# Patient Record
Sex: MALE | Race: WHITE | NOT HISPANIC OR LATINO | Employment: FULL TIME | URBAN - METROPOLITAN AREA
[De-identification: names, ages, dates, MRNs, and addresses within clinical notes are randomized per-mention and may not be internally consistent; named-entity substitution may affect disease eponyms.]

---

## 2018-03-14 ENCOUNTER — HOSPITAL ENCOUNTER (OUTPATIENT)
Dept: ONCOLOGY | Facility: CLINIC | Age: 33
Setting detail: INFUSION SERIES
Discharge: HOME | End: 2018-03-14
Attending: INTERNAL MEDICINE | Admitting: INTERNAL MEDICINE

## 2018-03-14 ENCOUNTER — CLINICAL SUPPORT (OUTPATIENT)
Dept: ONCOLOGY | Facility: HOSPITAL | Age: 33
End: 2018-03-14

## 2018-03-14 ENCOUNTER — HOSPITAL ENCOUNTER (OUTPATIENT)
Dept: ONCOLOGY | Facility: HOSPITAL | Age: 33
Discharge: HOME | End: 2018-03-14
Attending: INTERNAL MEDICINE | Admitting: INTERNAL MEDICINE

## 2018-03-14 LAB
ALBUMIN SERPL-MCNC: 4.6 G/DL (ref 3.5–4.8)
ALBUMIN/GLOB SERPL: 2.1 {RATIO} (ref 1–1.7)
ALP SERPL-CCNC: 47 IU/L (ref 32–91)
ALT SERPL-CCNC: 20 IU/L (ref 17–63)
ANION GAP SERPL CALC-SCNC: 12.6 MMOL/L (ref 10–20)
AST SERPL-CCNC: 22 IU/L (ref 15–41)
BILIRUB SERPL-MCNC: 1.2 MG/DL (ref 0.3–1.2)
BUN SERPL-MCNC: 12 MG/DL (ref 8–20)
BUN/CREAT SERPL: 13.3 (ref 6.2–20.3)
CALCIUM SERPL-MCNC: 9.4 MG/DL (ref 8.9–10.3)
CHLORIDE SERPL-SCNC: 103 MMOL/L (ref 101–111)
CONV CO2: 26 MMOL/L (ref 22–32)
CONV TOTAL PROTEIN: 6.8 G/DL (ref 6.1–7.9)
CREAT UR-MCNC: 0.9 MG/DL (ref 0.7–1.2)
GLOBULIN UR ELPH-MCNC: 2.2 G/DL (ref 2.5–3.8)
GLUCOSE SERPL-MCNC: 113 MG/DL (ref 65–99)
POTASSIUM SERPL-SCNC: 3.6 MMOL/L (ref 3.6–5.1)
SODIUM SERPL-SCNC: 138 MMOL/L (ref 136–144)

## 2018-03-14 NOTE — PROGRESS NOTES
PATIENTS ONCOLOGY RECORD LOCATED IN [facility]      Subjective     Name:  CHARLY EISENBERG     Date:  2018  Address:  [address] [place] [address] [place] [facility]  Home: [phone]  :  1985 AGE:  32 y.o.        RECORDS OBTAINED:  Patients Oncology Record is located in [facility]

## 2018-03-16 LAB
ANA SER QL IA: NORMAL

## 2018-04-09 ENCOUNTER — CLINICAL SUPPORT (OUTPATIENT)
Dept: ONCOLOGY | Facility: HOSPITAL | Age: 33
End: 2018-04-09

## 2018-04-09 ENCOUNTER — HOSPITAL ENCOUNTER (OUTPATIENT)
Dept: ONCOLOGY | Facility: CLINIC | Age: 33
Setting detail: INFUSION SERIES
Discharge: HOME | End: 2018-04-09
Attending: INTERNAL MEDICINE | Admitting: INTERNAL MEDICINE

## 2018-04-09 ENCOUNTER — HOSPITAL ENCOUNTER (OUTPATIENT)
Dept: ONCOLOGY | Facility: HOSPITAL | Age: 33
Discharge: HOME | End: 2018-04-09
Attending: INTERNAL MEDICINE | Admitting: INTERNAL MEDICINE

## 2018-04-09 NOTE — PROGRESS NOTES
PATIENTS ONCOLOGY RECORD LOCATED IN [facility]      Subjective     Name:  CHARLY EISENBERG     Date:  2018  Address:  [address] [place] [address] [place] [facility]  Home: [phone]  :  1985 AGE:  32 y.o.        RECORDS OBTAINED:  Patients Oncology Record is located in [facility]

## 2018-08-21 ENCOUNTER — CLINICAL SUPPORT (OUTPATIENT)
Dept: ONCOLOGY | Facility: HOSPITAL | Age: 33
End: 2018-08-21

## 2018-08-21 ENCOUNTER — HOSPITAL ENCOUNTER (OUTPATIENT)
Dept: ONCOLOGY | Facility: CLINIC | Age: 33
Setting detail: INFUSION SERIES
Discharge: HOME | End: 2018-08-21
Attending: INTERNAL MEDICINE | Admitting: INTERNAL MEDICINE

## 2018-08-21 ENCOUNTER — HOSPITAL ENCOUNTER (OUTPATIENT)
Dept: ONCOLOGY | Facility: HOSPITAL | Age: 33
Discharge: HOME | End: 2018-08-21
Attending: INTERNAL MEDICINE | Admitting: INTERNAL MEDICINE

## 2018-08-21 LAB
ALBUMIN SERPL-MCNC: 4.7 G/DL (ref 3.5–4.8)
ALBUMIN/GLOB SERPL: 1.7 {RATIO} (ref 1–1.7)
ALP SERPL-CCNC: 43 IU/L (ref 32–91)
ALT SERPL-CCNC: 23 IU/L (ref 17–63)
ANION GAP SERPL CALC-SCNC: 11.2 MMOL/L (ref 10–20)
AST SERPL-CCNC: 25 IU/L (ref 15–41)
BILIRUB SERPL-MCNC: 2 MG/DL (ref 0.3–1.2)
BUN SERPL-MCNC: 15 MG/DL (ref 8–20)
BUN/CREAT SERPL: 16.7 (ref 6.2–20.3)
CALCIUM SERPL-MCNC: 9.6 MG/DL (ref 8.9–10.3)
CHLORIDE SERPL-SCNC: 106 MMOL/L (ref 101–111)
CONV CO2: 24 MMOL/L (ref 22–32)
CONV TOTAL PROTEIN: 7.4 G/DL (ref 6.1–7.9)
CREAT UR-MCNC: 0.9 MG/DL (ref 0.7–1.2)
GLOBULIN UR ELPH-MCNC: 2.7 G/DL (ref 2.5–3.8)
GLUCOSE SERPL-MCNC: 105 MG/DL (ref 65–99)
POTASSIUM SERPL-SCNC: 4.2 MMOL/L (ref 3.6–5.1)
SODIUM SERPL-SCNC: 137 MMOL/L (ref 136–144)

## 2018-08-21 NOTE — PROGRESS NOTES
PATIENTS ONCOLOGY RECORD LOCATED IN [facility]      Subjective     Name:  CHARLY EISENBERG     Date:  2018  Address:  [address] [place] [address] [place] [facility]  Home: [phone]  :  1985 AGE:  33 y.o.        RECORDS OBTAINED:  Patients Oncology Record is located in [facility]

## 2018-08-22 LAB
IGA1 MFR SER: 211 MG/DL (ref 50–400)
IGG1 SER-MCNC: 1040 MG/DL (ref 600–1500)
IGM SERPL-MCNC: 77 MG/DL (ref 40–300)

## 2018-12-18 ENCOUNTER — HOSPITAL ENCOUNTER (OUTPATIENT)
Dept: ONCOLOGY | Facility: HOSPITAL | Age: 33
Discharge: HOME | End: 2018-12-18
Attending: INTERNAL MEDICINE | Admitting: INTERNAL MEDICINE

## 2018-12-18 ENCOUNTER — CLINICAL SUPPORT (OUTPATIENT)
Dept: ONCOLOGY | Facility: HOSPITAL | Age: 33
End: 2018-12-18

## 2018-12-18 ENCOUNTER — HOSPITAL ENCOUNTER (OUTPATIENT)
Dept: ONCOLOGY | Facility: CLINIC | Age: 33
Setting detail: INFUSION SERIES
Discharge: HOME | End: 2018-12-18
Attending: INTERNAL MEDICINE | Admitting: INTERNAL MEDICINE

## 2018-12-18 LAB
BILIRUB DIRECT SERPL-MCNC: 0.1 MG/DL (ref 0.1–0.5)
BILIRUB INDIRECT SERPL-MCNC: 0.7 MG/DL (ref 0–1.1)
BILIRUB SERPL-MCNC: 0.8 MG/DL (ref 0.3–1.2)

## 2018-12-18 NOTE — PROGRESS NOTES
PATIENTS ONCOLOGY RECORD LOCATED IN [facility]      Subjective     Name:  CHARLY EISENBERG     Date:  2018  Address:  [address] [place] [address] [place] [facility]  Home: [unfilled]  :  1985 AGE:  33 y.o.        RECORDS OBTAINED:  Patients Oncology Record is located in [facility]

## 2019-01-11 ENCOUNTER — HOSPITAL ENCOUNTER (OUTPATIENT)
Dept: ONCOLOGY | Facility: CLINIC | Age: 34
Setting detail: INFUSION SERIES
Discharge: HOME | End: 2019-01-11
Attending: INTERNAL MEDICINE | Admitting: INTERNAL MEDICINE

## 2019-01-11 ENCOUNTER — CLINICAL SUPPORT (OUTPATIENT)
Dept: ONCOLOGY | Facility: HOSPITAL | Age: 34
End: 2019-01-11

## 2019-01-11 NOTE — PROGRESS NOTES
PATIENTS ONCOLOGY RECORD LOCATED IN [facility]      Subjective     Name:  CHARLY EISENBERG     Date:  2019  Address:  [address] [place] [address] [place] [facility]  Home: [unfilled]  :  1985 AGE:  33 y.o.        RECORDS OBTAINED:  Patients Oncology Record is located in [facility]

## 2019-01-31 ENCOUNTER — HOSPITAL ENCOUNTER (OUTPATIENT)
Dept: ONCOLOGY | Facility: HOSPITAL | Age: 34
Discharge: HOME | End: 2019-01-31
Attending: INTERNAL MEDICINE | Admitting: INTERNAL MEDICINE

## 2019-01-31 ENCOUNTER — CLINICAL SUPPORT (OUTPATIENT)
Dept: ONCOLOGY | Facility: HOSPITAL | Age: 34
End: 2019-01-31

## 2019-01-31 ENCOUNTER — HOSPITAL ENCOUNTER (OUTPATIENT)
Dept: ONCOLOGY | Facility: CLINIC | Age: 34
Setting detail: INFUSION SERIES
Discharge: HOME | End: 2019-01-31
Attending: INTERNAL MEDICINE | Admitting: INTERNAL MEDICINE

## 2019-01-31 NOTE — PROGRESS NOTES
PATIENTS ONCOLOGY RECORD LOCATED IN [facility]      Subjective     Name:  CHARLY EISENBERG     Date:  2019  Address:  [address] [place] [address] [place] [facility]  Home: [unfilled]  :  1985 AGE:  33 y.o.        RECORDS OBTAINED:  Patients Oncology Record is located in [facility]

## 2019-07-23 PROBLEM — D70.4 CYCLICAL NEUTROPENIA (HCC): Status: ACTIVE | Noted: 2019-07-23

## 2019-07-23 PROBLEM — D70.9 NEUTROPENIA (HCC): Status: ACTIVE | Noted: 2019-07-23

## 2019-07-23 PROBLEM — E61.1 IRON DEFICIENCY: Status: ACTIVE | Noted: 2019-07-23

## 2019-07-23 NOTE — PROGRESS NOTES
Hematology/Oncology Outpatient Follow Up    PATIENT NAME:Aravind Beasley  :1985  MRN: [id number]  PRIMARY CARE PHYSICIAN: Juliano Maxwell MD  REFERRING PHYSICIAN: Juliano Maxwell MD    No chief complaint on file.       HISTORY OF PRESENT ILLNESS:   1. Neutropenia diagnosed 2017.  • This is a  male who claims to be feeling well. He was living in [place] up until the end of  when he claims to have had a slightly decreased blood count. He saw Juliano Maxwell M.D. as his primary care physician in  and had blood work performed on 17 revealing WBC 3.3 with 43% neutrophils, 43% lymphocytes, 6% monocytes, 6% eosinophils, hemoglobin 14.2, platelet count 160,000. A comprehensive metabolic panel at that time did not have any significant abnormalities and CBC was repeated on 17 revealing WBC 4.0 with 34% neutrophils, 51% lymphocytes, 8% monocytes, 7% eosinophils, hemoglobin 13.5, platelet count 147,000. He then had a CBC repeated recently on 18 revealing WBC 4.5 with 33% neutrophils, 49% lymphocytes, hemoglobin 14.4, platelet count 160,000. Patient was then referred here for further evaluation and today is denying having had any fevers, night sweats or weight loss. He denies any recurrent upper respiratory, genitourinary or skin infections. He has not had any recent viral infections. He does not have any family history of blood dyscrasias. He drinks alcohol socially up to 10 drinks per week.   • 3/14/18 – [facility] on referral by Juliano Maxwell M.D. WBC 3.4 with 50% neutrophils, 39% lymphocytes, 8% monocytes, hemoglobin 15.6, platelet count 143,000. Comprehensive metabolic panel with no significant abnormality. Folate >23.5 (5.9-24.8), Vitamin B12 of 374 (211-911),  (). RATNA screen negative. Neutrophil-associated antibody negative. EBV IgM 21 (<36), EBV IgG 366 (<18). CMV IgM 0.32 (<0.91), CMV  IgG 0.4 (<0.9). Platelet antibodies not detected. Discussed the possibility of patient having cyclic neutropenia.          • 4/9/18 – WBC 6.6 with 45% neutrophils, 45% lymphocytes, 7% monocytes, hemoglobin 15, platelet count 205,000. Peripheral blood flow cytometry with no evidence of abnormal myeloid maturation or increased blast population and no evidence for lymphoproliferative disorder.   • 8/21/18 – WBC 5.3 with 37% neutrophils, 51% lymphocytes, hemoglobin 15.7, platelet count 166,000. Blood pressure 173/107, but patient claims that this morning at home it was 120/85. Denies recurrent infections. IgA 211 (), IgG 1040 (600-1500), IgM 77 (). Free T4 of 0.92 (0.58-1.64), free T3 of 3.8 (2.39-6.79). CMV IgM 0.27 (<0.91), CMV IgG 0.5 (<0.9). Herpes 1 antibody IgG 0.09 (<0.9). Toxoplasma antibody IgG 0.6 (negative <6.4). Rubella antibody 79.2 (>9.9 immune).             • 12/18/18 – WBC 5.2 with 41% neutrophils and 47% lymphocytes. Discussed bone marrow biopsy. Patient will think about it. Total bilirubin 0.8 (0.3-1.2), direct bilirubin 0.1 (.1-0.5), indirect bilirubin 0.7 (0-1.1). T-cell gene rearrangement negative.       • 1/11/19 – Bone marrow aspiration and biopsy with normocellular bone marrow for age (60%) with maturing trilineage hematopoiesis. Marrow fibrosis absent. Decreased iron storage. Cytogenetics with 46 XY normal male karyotype. Immunohistochemical analysis with no evidence of acute leukemia. Flow cytometry with no evidence of abnormal cell type.   • 1/31/19 – Patient claims that is blood pressure was fine at Dr. Maxwell’s office. Discussed results of bone marrow. Recommended followup of counts with no intervention but checking iron stores.   Past Medical History:   Diagnosis Date   • Neutropenia (CMS/HCC) 02/2017       Past Surgical History:   Procedure Laterality Date   • WISDOM TOOTH EXTRACTION  2002       No current outpatient medications on file.    Allergies not on file    Family  History   Problem Relation Age of Onset   • Breast cancer Maternal Aunt 60   • Breast cancer Maternal Cousin 50       Cancer-related family history includes Breast cancer (age of onset: 50) in his maternal cousin; Breast cancer (age of onset: 60) in his maternal aunt.    Social History     Tobacco Use   • Smoking status: Former Smoker     Types: Cigarettes   • Tobacco comment: smoked about 2 cigarettes a day for 4 years in college, quitting in 2011   Substance Use Topics   • Alcohol use: Yes     Comment: drinks about 10 alcoholic drinks a week   • Drug use: No       I have reviewed the history of present illness, past medical history, family history, social history, lab results, all notes and other records since the patient was last seen on Visit 1/31/19.    SUBJECTIVE:      Kylie Amador CMA (PAWAN) was present during office visit.           REVIEW OF SYSTEMS:  Review of Systems    OBJECTIVE:    There were no vitals filed for this visit.    ECOG  {[facility] ECOG Status:65001}    Physical Exam    RECENT LABS  No results found for: WBC, RBC, HGB, HCT, MCV, MCH, MCHC, RDW, RDWSD, MPV, PLT, NEUTRORELPCT, LYMPHORELPCT, MONORELPCT, EOSRELPCT, BASORELPCT, AUTOIGPER, NEUTROABS, LYMPHSABS, MONOSABS, EOSABS, BASOSABS, AUTOIGNUM, NRBC    Lab Results   Component Value Date    GLUCOSE 105 (H) 08/21/2018    BUN 15 08/21/2018    CREATININE 0.9 08/21/2018    BCR 16.7 08/21/2018    K 4.2 08/21/2018    CO2 24 08/21/2018    CALCIUM 9.6 08/21/2018    ALBUMIN 4.7 08/21/2018    LABIL2 1.7 08/21/2018    AST 25 08/21/2018    ALT 23 08/21/2018         ASSESSMENT:    Cyclical neutropenia (CMS/HCC)    Iron deficiency              PLAN:     1.          I have reviewed labs results, imaging, vitals, and medications with the patient today. Will follow up in *** months with ***.         Patient verbalized understanding and is in agreement of the above plan.      Much of the above report is an electronic transcription//translation of the  spoken language to printed text using Dragon Software. As such, the subtleties and finesse of the spoken language may permit erroneous, or at times, nonsensical words or phrases to be inadvertently transcribed; thus changes may be made at a later date to rectify these errors.

## 2019-07-26 ENCOUNTER — APPOINTMENT (OUTPATIENT)
Dept: ONCOLOGY | Facility: CLINIC | Age: 34
End: 2019-07-26

## 2019-07-26 ENCOUNTER — APPOINTMENT (OUTPATIENT)
Dept: LAB | Facility: HOSPITAL | Age: 34
End: 2019-07-26

## 2019-08-09 ENCOUNTER — APPOINTMENT (OUTPATIENT)
Dept: LAB | Facility: HOSPITAL | Age: 34
End: 2019-08-09

## 2019-08-09 ENCOUNTER — OFFICE VISIT (OUTPATIENT)
Dept: ONCOLOGY | Facility: CLINIC | Age: 34
End: 2019-08-09

## 2019-08-09 VITALS
SYSTOLIC BLOOD PRESSURE: 154 MMHG | TEMPERATURE: 98.2 F | HEIGHT: 71 IN | RESPIRATION RATE: 16 BRPM | WEIGHT: 203.6 LBS | DIASTOLIC BLOOD PRESSURE: 101 MMHG | HEART RATE: 80 BPM | BODY MASS INDEX: 28.5 KG/M2

## 2019-08-09 DIAGNOSIS — D70.4 CYCLICAL NEUTROPENIA (HCC): ICD-10-CM

## 2019-08-09 DIAGNOSIS — E61.1 IRON DEFICIENCY: Primary | ICD-10-CM

## 2019-08-09 LAB
BASOPHILS # BLD AUTO: 0.03 10*3/MM3 (ref 0–0.2)
BASOPHILS NFR BLD AUTO: 0.6 % (ref 0–1.5)
DEPRECATED RDW RBC AUTO: 42.7 FL (ref 37–54)
EOSINOPHIL # BLD AUTO: 0.29 10*3/MM3 (ref 0–0.4)
EOSINOPHIL NFR BLD AUTO: 5.9 % (ref 0.3–6.2)
ERYTHROCYTE [DISTWIDTH] IN BLOOD BY AUTOMATED COUNT: 13 % (ref 12.3–15.4)
HCT VFR BLD AUTO: 45.4 % (ref 37.5–51)
HGB BLD-MCNC: 15.6 G/DL (ref 13–17.7)
LYMPHOCYTES # BLD AUTO: 2.39 10*3/MM3 (ref 0.7–3.1)
LYMPHOCYTES NFR BLD AUTO: 48.7 % (ref 19.6–45.3)
MCH RBC QN AUTO: 31.4 PG (ref 26.6–33)
MCHC RBC AUTO-ENTMCNC: 34.4 G/DL (ref 31.5–35.7)
MCV RBC AUTO: 91.3 FL (ref 79–97)
MONOCYTES # BLD AUTO: 0.38 10*3/MM3 (ref 0.1–0.9)
MONOCYTES NFR BLD AUTO: 7.7 % (ref 5–12)
NEUTROPHILS # BLD AUTO: 1.82 10*3/MM3 (ref 1.7–7)
NEUTROPHILS NFR BLD AUTO: 37.1 % (ref 42.7–76)
PLATELET # BLD AUTO: 163 10*3/MM3 (ref 140–450)
PMV BLD AUTO: 10.5 FL (ref 6–12)
RBC # BLD AUTO: 4.97 10*6/MM3 (ref 4.14–5.8)
WBC NRBC COR # BLD: 4.91 10*3/MM3 (ref 3.4–10.8)

## 2019-08-09 PROCEDURE — 85025 COMPLETE CBC W/AUTO DIFF WBC: CPT | Performed by: NURSE PRACTITIONER

## 2019-08-09 PROCEDURE — 36415 COLL VENOUS BLD VENIPUNCTURE: CPT | Performed by: NURSE PRACTITIONER

## 2019-08-09 PROCEDURE — 99213 OFFICE O/P EST LOW 20 MIN: CPT | Performed by: NURSE PRACTITIONER

## 2019-08-09 NOTE — PROGRESS NOTES
Aravind Beasley  1985    Primary Care Physician: Juliano Maxwell MD  Referring Physician: Juliano Maxwell MD  Reason For Visit:    Chief Complaint   Patient presents with   • Follow-up     Cyclic neutropenia and iron deficiency         HISTORY OF PRESENT ILLNESS:   1. Neutropenia diagnosed February 2017.  • This is a  male who claims to be feeling well. He was living in [place] up until the end of 2013 when he claims to have had a slightly decreased blood count. He saw Juliano Maxwell M.D. as his primary care physician in 2017 and had blood work performed on 2/14/17 revealing WBC 3.3 with 43% neutrophils, 43% lymphocytes, 6% monocytes, 6% eosinophils, hemoglobin 14.2, platelet count 160,000. A comprehensive metabolic panel at that time did not have any significant abnormalities and CBC was repeated on 6/26/17 revealing WBC 4.0 with 34% neutrophils, 51% lymphocytes, 8% monocytes, 7% eosinophils, hemoglobin 13.5, platelet count 147,000. He then had a CBC repeated recently on 2/16/18 revealing WBC 4.5 with 33% neutrophils, 49% lymphocytes, hemoglobin 14.4, platelet count 160,000. Patient was then referred here for further evaluation and today is denying having had any fevers, night sweats or weight loss. He denies any recurrent upper respiratory, genitourinary or skin infections. He has not had any recent viral infections. He does not have any family history of blood dyscrasias. He drinks alcohol socially up to 10 drinks per week.   • 3/14/18 - [facility]enia on referral by Juliano Maxwell M.D. WBC 3.4 with 50% neutrophils, 39% lymphocytes, 8% monocytes, hemoglobin 15.6, platelet count 143,000. Comprehensive metabolic panel with no significant abnormality. Folate >23.5 (5.9-24.8), Vitamin B12 of 374 (211-911),  (). RATNA screen negative. Neutrophil-associated antibody negative. EBV IgM 21 (<36), EBV IgG 366 (<18). CMV IgM 0.32  (<0.91), CMV IgG 0.4 (<0.9). Platelet antibodies not detected. Discussed the possibility of patient having cyclic neutropenia.          • 4/9/18 - WBC 6.6 with 45% neutrophils, 45% lymphocytes, 7% monocytes, hemoglobin 15, platelet count 205,000. Peripheral blood flow cytometry with no evidence of abnormal myeloid maturation or increased blast population and no evidence for lymphoproliferative disorder.   • 8/21/18 - WBC 5.3 with 37% neutrophils, 51% lymphocytes, hemoglobin 15.7, platelet count 166,000. Blood pressure 173/107, but patient claims that this morning at home it was 120/85. Denies recurrent infections. IgA 211 (), IgG 1040 (600-1500), IgM 77 (). Free T4 of 0.92 (0.58-1.64), free T3 of 3.8 (2.39-6.79). CMV IgM 0.27 (<0.91), CMV IgG 0.5 (<0.9). Herpes 1 antibody IgG 0.09 (<0.9). Toxoplasma antibody IgG 0.6 (negative <6.4). Rubella antibody 79.2 (>9.9 immune).             • 12/18/18 - WBC 5.2 with 41% neutrophils and 47% lymphocytes. Discussed bone marrow biopsy. Patient will think about it. Total bilirubin 0.8 (0.3-1.2), direct bilirubin 0.1 (.1-0.5), indirect bilirubin 0.7 (0-1.1). T-cell gene rearrangement negative.       • 1/11/19 - Bone marrow aspiration and biopsy with normocellular bone marrow for age (60%) with maturing trilineage hematopoiesis. Marrow fibrosis absent. Decreased iron storage. Cytogenetics with 46 XY normal male karyotype. Immunohistochemical analysis with no evidence of acute leukemia. Flow cytometry with no evidence of abnormal cell type.   • 1/31/19 - Patient claims that is blood pressure was fine at Dr. Maxwell’s office. Discussed results of bone marrow. Recommended followup of counts with no intervention but checking iron stores.  Ferritin 116 (N).       Past Medical History:   Diagnosis Date   • Neutropenia (CMS/HCC) 02/2017       Past Surgical History:   Procedure Laterality Date   • WISDOM TOOTH EXTRACTION  2002       No current outpatient medications on  file.    Allergies   Allergen Reactions   • Penicillins Rash     Patient states he does not remember his reaction   • Sulfa Antibiotics Rash     Patient states he does not remember his reaction       Family History   Problem Relation Age of Onset   • Breast cancer Maternal Aunt 60   • Breast cancer Maternal Cousin 50       Cancer-related family history includes Breast cancer (age of onset: 50) in his maternal cousin; Breast cancer (age of onset: 60) in his maternal aunt.    Social History     Tobacco Use   • Smoking status: Former Smoker     Types: Cigarettes   • Tobacco comment: smoked about 2 cigarettes a day for 4 years in college, quitting in 2011   Substance Use Topics   • Alcohol use: Yes     Comment: drinks about 10 alcoholic drinks a week   • Drug use: No     I have reviewed the history of present illness, past medical history, family history, social history, lab results, all notes and other records since the patient was last seen on 1/31/19.    Subjective: The patient is here for follow-up of his cyclic neutropenia and iron deficiency.  He states he is doing well, his blood pressure this morning was 135/84.  He is exercising and training for the [employer].  He does not add salt to his foods.  He saw the nurse practitioner at his PMD's office a week ago for fluid in 1 of his ears and was put on prednisone pack.        Review of Systems   Constitutional: Negative for diaphoresis, fatigue, fever and unexpected weight change.   HENT: Negative for congestion and nosebleeds.    Eyes: Negative.    Respiratory: Negative for cough and shortness of breath.    Cardiovascular: Negative for chest pain and leg swelling.   Gastrointestinal: Negative for abdominal pain, blood in stool, constipation, diarrhea, nausea and vomiting.   Endocrine: Negative for cold intolerance and heat intolerance.   Genitourinary: Negative for dysuria and hematuria.   Musculoskeletal: Negative for arthralgias and joint swelling.   Skin: Negative  "for rash and wound.   Neurological: Negative for numbness and headaches.   Hematological: Does not bruise/bleed easily.   Psychiatric/Behavioral: Negative for confusion. The patient is not nervous/anxious.    All other systems reviewed and are negative.      Objective:    Vitals:    08/09/19 0818   BP: (!) 154/101   Pulse: 80   Resp: 16   Temp: 98.2 °F (36.8 °C)   Weight: 92.4 kg (203 lb 9.6 oz)   Height: 180.3 cm (71\")   PainSc: 0-No pain     Body mass index is 28.4 kg/m².      08/09/19 0818   Weight: 92.4 kg (203 lb 9.6 oz)       KPS: 100%   Physical Exam   Constitutional: He is oriented to person, place, and time. He appears well-developed and well-nourished.   Healthy appearing.  Alone.   HENT:   Head: Normocephalic and atraumatic.   Mouth/Throat: Oropharynx is clear and moist.   Eyes: Conjunctivae, EOM and lids are normal. Pupils are equal, round, and reactive to light.   Neck: Normal range of motion. Neck supple. No thyromegaly present.   Cardiovascular: Normal rate, regular rhythm and normal heart sounds.   No murmur heard.  Pulmonary/Chest: Effort normal and breath sounds normal. No respiratory distress.   Abdominal: Soft. Normal appearance and bowel sounds are normal. He exhibits no distension.   Genitourinary:   Genitourinary Comments: Deferred.   Musculoskeletal: Normal range of motion. He exhibits no edema.   Lymphadenopathy:     He has no cervical adenopathy.     He has no axillary adenopathy.        Right: No supraclavicular adenopathy present.        Left: No supraclavicular adenopathy present.   Neurological: He is alert and oriented to person, place, and time.   Skin: Skin is warm and dry. Capillary refill takes less than 2 seconds. No bruising, no petechiae and no rash noted.   Psychiatric: He has a normal mood and affect. His speech is normal and behavior is normal. Judgment and thought content normal. Cognition and memory are normal.   Nursing note and vitals reviewed.      WBC   Date Value Ref " Range Status   08/09/2019 4.91 3.40 - 10.80 10*3/mm3 Final     RBC   Date Value Ref Range Status   08/09/2019 4.97 4.14 - 5.80 10*6/mm3 Final     Hemoglobin   Date Value Ref Range Status   08/09/2019 15.6 13.0 - 17.7 g/dL Final     Hematocrit   Date Value Ref Range Status   08/09/2019 45.4 37.5 - 51.0 % Final     MCV   Date Value Ref Range Status   08/09/2019 91.3 79.0 - 97.0 fL Final     MCH   Date Value Ref Range Status   08/09/2019 31.4 26.6 - 33.0 pg Final     MCHC   Date Value Ref Range Status   08/09/2019 34.4 31.5 - 35.7 g/dL Final     RDW   Date Value Ref Range Status   08/09/2019 13.0 12.3 - 15.4 % Final     RDW-SD   Date Value Ref Range Status   08/09/2019 42.7 37.0 - 54.0 fl Final     MPV   Date Value Ref Range Status   08/09/2019 10.5 6.0 - 12.0 fL Final     Platelets   Date Value Ref Range Status   08/09/2019 163 140 - 450 10*3/mm3 Final     Neutrophil %   Date Value Ref Range Status   08/09/2019 37.1 (L) 42.7 - 76.0 % Final     Lymphocyte %   Date Value Ref Range Status   08/09/2019 48.7 (H) 19.6 - 45.3 % Final     Monocyte %   Date Value Ref Range Status   08/09/2019 7.7 5.0 - 12.0 % Final     Eosinophil %   Date Value Ref Range Status   08/09/2019 5.9 0.3 - 6.2 % Final     Basophil %   Date Value Ref Range Status   08/09/2019 0.6 0.0 - 1.5 % Final     Neutrophils, Absolute   Date Value Ref Range Status   08/09/2019 1.82 1.70 - 7.00 10*3/mm3 Final     Lymphocytes, Absolute   Date Value Ref Range Status   08/09/2019 2.39 0.70 - 3.10 10*3/mm3 Final     Monocytes, Absolute   Date Value Ref Range Status   08/09/2019 0.38 0.10 - 0.90 10*3/mm3 Final     Eosinophils, Absolute   Date Value Ref Range Status   08/09/2019 0.29 0.00 - 0.40 10*3/mm3 Final     Basophils, Absolute   Date Value Ref Range Status   08/09/2019 0.03 0.00 - 0.20 10*3/mm3 Final         Assessment/Plan      Assessment  1.  Cyclic neutropenia-white blood cell count slightly low with an adequate ANC.  No fevers.  Continue to follow  2.  Iron  deficiency-ferritin level normal.  Denies overt bleeding.  Stool heme ordered.  Urinalysis next visit to evaluate for possible blood loss that is seen frequently in athletes.  3.  HTN-pressure 154/101.  Complicated by recent steroids.  CMP next visit. Continue to monitor and discuss with primary MD.    PLAN  Stool heme  CMP and UA next visit  Limit salt intake.           I have reviewed labs results, vitals, and medications with the patient today. Will follow up in 6 months with MD.      Electronically signed by CINTHIA Shahid, 08/09/19, 6:25 PM.    Much of the above report is an electronic transcription/translation of the spoken language to printed text using Dragon Software. As such, the subtleties and finesse of the spoken language may permit erroneous, or at times, nonsensical words or phrases to be inadvertently transcribed; thus changes may be made at a later date to rectify these errors.

## 2020-02-13 ENCOUNTER — APPOINTMENT (OUTPATIENT)
Dept: LAB | Facility: HOSPITAL | Age: 35
End: 2020-02-13

## 2024-12-27 ENCOUNTER — HOSPITAL ENCOUNTER (EMERGENCY)
Facility: HOSPITAL | Age: 39
Discharge: HOME | End: 2024-12-27
Attending: EMERGENCY MEDICINE
Payer: COMMERCIAL

## 2024-12-27 ENCOUNTER — APPOINTMENT (OUTPATIENT)
Dept: GENERAL RADIOLOGY | Facility: HOSPITAL | Age: 39
End: 2024-12-27
Payer: COMMERCIAL

## 2024-12-27 VITALS
OXYGEN SATURATION: 99 % | TEMPERATURE: 99.1 F | WEIGHT: 238.1 LBS | HEIGHT: 71 IN | RESPIRATION RATE: 18 BRPM | HEART RATE: 138 BPM | DIASTOLIC BLOOD PRESSURE: 115 MMHG | BODY MASS INDEX: 33.33 KG/M2 | SYSTOLIC BLOOD PRESSURE: 203 MMHG
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
## 2024-12-27 DIAGNOSIS — S86.912A KNEE STRAIN, LEFT, INITIAL ENCOUNTER: Primary | ICD-10-CM

## 2024-12-27 PROCEDURE — 73562 X-RAY EXAM OF KNEE 3: CPT

## 2024-12-27 PROCEDURE — 99283 EMERGENCY DEPT VISIT LOW MDM: CPT

## 2024-12-27 NOTE — ED PROVIDER NOTES
"Subjective   History of Present Illness  Chief complaint: Left knee pain    39-year-old male presents with left knee pain after a fall yesterday.  Patient states he slipped and fell and landed on his left knee.  States he feels like the left knee twisted a little bit as well.  He has been able to ambulate but with some pain.  He states pain is mostly along the medial aspect of the knee.  He denies any other injuries from the fall.    History provided by:  Patient      Review of Systems   Constitutional:  Negative for fever.   HENT:  Negative for congestion.    Respiratory:  Negative for cough and shortness of breath.    Cardiovascular:  Negative for chest pain.   Gastrointestinal:  Negative for abdominal pain.   Musculoskeletal:  Negative for back pain.        Left knee pain   Neurological:  Negative for headaches.   Psychiatric/Behavioral:  Negative for confusion.        Past Medical History:   Diagnosis Date    Neutropenia 02/2017       Allergies   Allergen Reactions    Penicillins Rash     Patient states he does not remember his reaction    Sulfa Antibiotics Rash     Patient states he does not remember his reaction       Past Surgical History:   Procedure Laterality Date    WISDOM TOOTH EXTRACTION  2002       Family History   Problem Relation Age of Onset    Breast cancer Maternal Aunt 60    Breast cancer Maternal Cousin 50       Social History     Socioeconomic History    Marital status:    Tobacco Use    Smoking status: Former     Types: Cigarettes    Tobacco comments:     smoked about 2 cigarettes a day for 4 years in college, quitting in 2011   Substance and Sexual Activity    Alcohol use: Yes     Comment: drinks about 10 alcoholic drinks a week    Drug use: No       BP (!) 203/115 (BP Location: Left arm, Patient Position: Sitting)   Pulse (!) 138   Temp 99.1 °F (37.3 °C) (Oral)   Resp 18   Ht 180.3 cm (71\")   Wt 108 kg (238 lb 1.6 oz)   SpO2 99%   BMI 33.21 kg/m²       Objective   Physical " Exam  Vitals and nursing note reviewed.   Constitutional:       Appearance: Normal appearance.   HENT:      Head: Normocephalic and atraumatic.      Mouth/Throat:      Mouth: Mucous membranes are moist.   Cardiovascular:      Rate and Rhythm: Normal rate and regular rhythm.   Pulmonary:      Effort: Pulmonary effort is normal. No respiratory distress.   Musculoskeletal:      Comments: Tender to palpation along the medial aspect of the left knee with no visible injury or deformity.  No obvious ligamentous laxity.  Neurovascular intact distally.   Skin:     General: Skin is warm and dry.   Neurological:      Mental Status: He is alert and oriented to person, place, and time.         Procedures           ED Course      XR Knee 3 View Left    Result Date: 12/27/2024  Impression: Tiny superior patellar enthesophyte. Otherwise, normal left knee series Electronically Signed: Latoya Short MD  12/27/2024 11:55 AM EST  Workstation ID: [id number]                                                    Medical Decision Making    My interpretation of left knee x-ray shows no fracture or dislocation.  Patient was placed in a knee immobilizer.  He will be discharged to follow-up with his primary doctor and orthopedic doctor.  We discussed possible need for MRI if symptoms continue      Final diagnoses:   Knee strain, left, initial encounter   Fall, initial encounter       ED Disposition  ED Disposition       ED Disposition   Discharge    Condition   Stable    Comment   --               Juliano Maxwell MD  [address] [facility] [place] [id number] [phone]    Call in 2 days           Medication List      No changes were made to your prescriptions during this visit.            Waylon Wakefield MD  12/27/24 [id number]

## 2024-12-27 NOTE — DISCHARGE INSTRUCTIONS
Follow-up with your primary doctor.  Return to the emergency room for any new or worsening symptoms or if you have any other questions or concerns.  Use knee immobilizer as needed for comfort.  Okay to take knee out of brace for range of motion.